# Patient Record
Sex: FEMALE | Race: WHITE
[De-identification: names, ages, dates, MRNs, and addresses within clinical notes are randomized per-mention and may not be internally consistent; named-entity substitution may affect disease eponyms.]

---

## 2018-12-11 ENCOUNTER — HOSPITAL ENCOUNTER (INPATIENT)
Dept: HOSPITAL 80 - BREH | Age: 83
LOS: 7 days | Discharge: HOME | DRG: 57 | End: 2018-12-18
Attending: INTERNAL MEDICINE | Admitting: INTERNAL MEDICINE
Payer: COMMERCIAL

## 2018-12-11 DIAGNOSIS — D44.6: ICD-10-CM

## 2018-12-11 DIAGNOSIS — T44.0X6A: ICD-10-CM

## 2018-12-11 DIAGNOSIS — R13.19: ICD-10-CM

## 2018-12-11 DIAGNOSIS — G70.01: Primary | ICD-10-CM

## 2018-12-11 DIAGNOSIS — E03.9: ICD-10-CM

## 2018-12-11 RX ADMIN — PYRIDOSTIGMINE BROMIDE SCH MG: 60 TABLET ORAL at 20:09

## 2018-12-11 NOTE — GHP
POST ADMISSION PHYSICIAN EVALUATION AND REHABILITATION TREATMENT PLAN



DATE OF ADMISSION:  12/11/2018



DATE OF EVALUATION:  12/11/2018.



TIME OF EVALUATION:  1730 hours.



REFERRING FACILITY:  [facility].



REFERRING PHYSICIAN:  Toño



IMPAIRMENT GROUP:  3.8.



DATE OF ONSET:  12/01/2018.



CONSULTING PHYSICIANS:  She was seen in consultation by neurologist, Dr. Givens.  
Critical care physician, Dr. Francisco. Nephrologist, Dr. Pan.



REHABILITATION DIAGNOSIS:  Debility, status post myasthenia gravis crisis.



ETIOLOGIC DIAGNOSIS:  Neuromuscular disorders.



HISTORY OF PRESENT ILLNESS:  This patient has a history of myasthenia gravis.  
She was treated with pyridostigmine, but she reports that she was not 
consistent with taking it.  She had approximately a 1-week history of waxing 
and waning symptoms with difficulty speaking and swallowing, difficulty 
clearing secretions, perioral/oral numbness, and coughing.  At the hospital, 
she was found to be in acute respiratory failure and unable to clear 
secretions.  She was intubated on 12/01/2018 and extubated on 12/03/2018.  She 
received plasma exchange and 5 days of IVIG.  She was treated with high-dose 
corticosteroids, as well as pyridostigmine.  She had an episode of paroxysmal 
atrial fibrillation while in the hospital.  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 
a right carotid paraganglioma.  It was evaluated for catecholamine secretion 
with a test for urinary metanephrines, which was negative.She had considerable 
improvement, and is otherwise medically stable and ready for inpatient 
rehabilitation.  



LABS AND STUDIES DURING HER STAY:  Basic metabolic profile was followed, and 
was normal and remained stable on tests to which I have access 12/08 through 12/
10/2018.  CBC showed an elevated white blood cell count consistent with 
corticosteroid treatment.  She also had a low platelet count.  Otherwise, CBC 
was normal.  Triglycerides were normal.  Brain imaging showed white matter 
changes consistent with ischemic small vessel disease.  She had mild-to-
moderate proximal left vertebral artery stenosis, and the paraganglioma was 
seen.  There was no evidence of acute infarction and no significant mass 
lesions seen.  Chest x-rays ruled out pneumonia and verified placement of 
endotracheal tube.  An abdominal x-ray verified placement of a small bowel 
feeding tube.



PRECAUTIONS:  She is a fall risk, and she has aspiration precautions.



ACTIVE COMORBIDITIES:  She has the tier 2 comorbidity of dysphagia.  She has no 
other active tier 1, tier 2, or tier 3 comorbidities.



PAST MEDICAL HISTORY:  

1.  Hypothyroidism.

2.  Myasthenia gravis.

3.  Kyphosis.



PAST SURGICAL HISTORY:  She has not had any surgeries.



PRE-HOSPITAL MEDICATIONS:  

1.  Levothyroxine 100 mcg p.o. daily.

2.  Pyridostigmine 60 mg p.o. three times daily.



ADMISSION MEDICATIONS:  

1.  Levothyroxine 100 mcg p.o. daily.

2.  Omeprazole 20 mg p.o. daily, to be taken as long as she is taking 
prednisone.

3.  Prednisone 40 mg p.o. daily, with taper to be determined on neurology 
followup.

4.  Pyridostigmine 60 mg p.o. three times daily.



ALLERGIES:  There is an allergy listed to penicillin.



PSYCHOSOCIAL HISTORY:  She lives alone.  She has a downstairs roommate, whom 
she was able to call for help, who took her to the hospital.  She is a 
nonsmoker and a nondrinker.  She worked at the [employer] as a 
.  She has a local son.



FAMILY HISTORY:  Noncontributory.



REVIEW OF SYSTEMS:  She is not in pain.  She reports she has had considerable 
improvement in terms of her strength, but she feels weak due to her multiple 
days flat on her back in the hospital.  She denies difficulty swallowing.  She 
does not notice fatigue of small muscles with repetitive use and is not aware 
of any dysarthria.  She has no vision changes.  She reports normal sensation.  
She denies cough or dyspnea.  She denies chest pain or palpitations.  She 
denies nausea, vomiting, constipation, or diarrhea, and she has a good 
appetite.  She denies dysuria or urinary frequency.  Otherwise, a 10-point 
review of systems is negative.



PHYSICAL EXAM:  VITAL SIGNS:  Blood pressure is 134/78, heart rate 75, 
respiratory rate 16, oxygen saturation 95% on room air, and temperature 36.8 
degrees centigrade.  Her weight is 67.1 kg for a body mass index of 23.9.  
GENERAL:  This is a well-nourished, well-developed elderly female, wearing 
hospital scrubs, sitting up in a chair, eating dinner, cooperative and in no 
acute distress.  HEENT:  Extraocular movements are intact.  Pupils are equal, 
round, and reactive to light.  Mucous membranes are moist.  Dentition is in 
good condition.  She has been uncrowded airway, Mallampati class 1.  NECK:  
Supple.  HEART:  There is a regular rate and rhythm with no murmurs, rubs, or 
gallops.  LUNGS:  Clear to auscultation bilaterally.  ABDOMEN:  Soft, nontender
, and nondistended with normoactive bowel sounds.  BACK AND EXTREMITIES:  She 
has notable kyphosis.  There is no cyanosis, clubbing, or edema.  Radial and 
dorsalis pedis pulses are 2+ bilaterally.  NEUROLOGIC:  She is alert and 
oriented x3.  Cranial nerves 2 through 12 are grossly intact.  There is no 
focal weakness.  Sensation is intact to light touch.  There is no dysarthria.  
She is able to arise from seated independently, and she can ambulate with a 
slightly wide base of support and short steps.



CURRENT LEVEL OF FUNCTION PER THE PREADMISSION SCREEN:  She was on a dysphagia 
1 diet with nectar-thick liquids; however, she has since been advanced to a 
regular textured diet and thin liquids.  Grooming required setup and 
supervision.  Dressing required maximal assist for lower body dressing, 
especially socks.  Toileting was done with supervision.  Bed mobility required 
moderate assist from supine to sit.  Transfers required supervision for sit to 
stand.  She used a front-wheeled walker.  She was able to stand as long as 10 
minutes with occasional rests.  She was able to ambulate with supervision and a 
front-wheeled walker for 150 feet.  Communication and cognition were considered 
to be intact. 



On today's exam, she appears to have developed more independence with mobility.
  Otherwise, there are no significant changes from the pre-admission screen.



IMPRESSION:  This is an 83-year-old woman with a history of myasthenia gravis, 
who was inconsistent in use of pyridostigmine and developed a myasthenia gravis 
crisis with inability to clear secretions, difficulty swallowing, and 
subsequent acute respiratory failure.  She was intubated.  She received 
treatment including plasmapheresis, IVIG, and high-dose steroids, and she has 
had recovery.  However, she remains in a debilitated state from her time in the 
hospital, including intubation and associated bedrest and inactivity.  During 
her hospitalization, she had an episode of paroxysmal atrial fibrillation which 
resolved.  She was discharged on no anticoagulation.  There was also an 
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 
ENT.  She otherwise was medically stable and is appropriate for inpatient 
rehabilitation. 



Her goal is to complete a rehabilitation stay and then return home with support 
from her roommate and her son.  She wishes to be independent for mobility and 
activities of daily.  For a safe discharge, it is anticipated that she will be 
able to advance to a modified independent level for all activities of daily 
living and mobility using the least restrictive device.  She may have support 
from her roommate and her son for household duties.  She will need to be on the 
least restrictive diet and to manage oral secretions.  She will need to have 
insight into her deficits and be able to use compensatory strategies 
independently. 



She will have therapy with Physical Therapy, Occupational Therapy, and Speech 
and Language Pathology for 60 minutes per day for each discipline on 5 to 7 
days of the week.  Her expected duration of stay is 5 to 7 days. 



It is anticipated that upon discharge, she may benefit from home health 
services including Nursing, Speech and Language Pathology, Occupational Therapy
, and Physical Therapy, as well as a myasthenia gravis support group.



PLAN:  

1.  Debility following hospitalization for myasthenia gravis with considerable 
recovery following treatment with plasmapheresis, IVIG, and high-dose 
corticosteroids.  PT and OT to optimize mobility and activities of daily living 
towards discharge home at the modified independent level.

2.  Dysphagia appears to have largely resolved.  She is discharged on thin 
liquids and a regular texture diet.  She will have screening per Speech and 
Language Pathology.

3.  Myasthenia gravis.  Continue prednisone at 40 mg daily and pyridostigmine 
at 60 mg three times daily.  She will follow up with Neurology after her 
discharge for a prednisone taper.

4.  Hypothyroidism.  Continue levothyroxine.

5.  Right carotid paraganglioma.  Plan from the hospital was to follow up with 
ENT, as well as Oncology at [facility].

6.  Prophylaxis.  The episode of atrial fibrillation was considered to be due 
to critical illness, and no recommendation was made regarding anticoagulation.  
She is on prednisone at a high dose, and so she has been cautiously prescribed 
omeprazole for GI protection.  She will continue likely pantoprazole per 
hospital formulary, likely throughout her rehabilitation stay.



FOLLOWUP:  She will follow up with primary care provider, Dr. Longo, at the 
[facility].  She will follow up with 
[facility] within 1 to 2 weeks.  She will follow up regarding her 
paraganglioma, with the plan being to be seen by a specialist at [facility].





Job #:  [id number]

MTDD

## 2018-12-12 RX ADMIN — PYRIDOSTIGMINE BROMIDE SCH MG: 60 TABLET ORAL at 08:37

## 2018-12-12 RX ADMIN — PYRIDOSTIGMINE BROMIDE SCH MG: 60 TABLET ORAL at 15:58

## 2018-12-12 RX ADMIN — PYRIDOSTIGMINE BROMIDE SCH MG: 60 TABLET ORAL at 20:21

## 2018-12-12 RX ADMIN — LEVOTHYROXINE SODIUM SCH MCG: 100 TABLET ORAL at 06:17

## 2018-12-12 RX ADMIN — PANTOPRAZOLE SODIUM SCH MG: 40 TABLET, DELAYED RELEASE ORAL at 06:17

## 2018-12-12 NOTE — SOAPPROG
SOAP Progress Note


Assessment/Plan: 


Assessment:





Debility-recent hospitalization from ice any a gravis with considerable 

recovery following treatment with plasmapheresis, IVIG, and high-dose 

corticosteroids.  PT and OT to optimize mobility and activities of daily living 

towards discharge home at the [facility]





Dysphagia-She was admitted to the rehab service on thin liquids and regular 

texture diet.  Speech and language pathology consulted and awaiting input





Myasthenis Gravis.  Continue prednisone 40 mg daily and pyridostigmine 60 mg 

three times daily.  Follow-up with Neurology after her discharge for prednisone 

taper.








Hypothyroidism.  Continue levothyroxine.





Right carotid paraganglioma.  Follow-up with ENT as well as Oncology at 

[facility].





Prophylaxis.  The episode of atrial fibrillation was considered to be due to 

critical illness and no recommendation was made regarding anticoagulation.  She 

is on prednisone at high dose and so she has been cautiously prescribed 

omeprazole for GI prophylaxis.





Dorsal kyphosis-physical therapy to instruct on exercises to stretch the 

pectorals and strengthen the shoulder elevators and retractors to improve 

posture and pulmonary function.





Follow-up:  She will follow-up with primary care provider, Dr. Longo at the 

[facility] clinic.  She will have follow-up with [facility] within 1

-2 weeks.  She will have follow-up regarding her para ganglioma at [facility]























Plan:





12/12/18 12:36





Subjective: 





She reports she feels a little bit fatigued after multiple therapy sessions 

this morning.  She does report that she is feeling stronger.  She denies 

shortness of breath or dyspnea on exertion.  No reports of anginal like 

symptoms during therapy.


Objective: 





 Vital Signs











Temp Pulse Resp BP Pulse Ox


 


 36.6 C   66   16   138/76 H  96 


 


 12/12/18 08:00  12/12/18 08:00  12/12/18 08:00  12/12/18 08:00  12/12/18 08:00








 Laboratory Results





 12/12/18 06:50 





 











 12/11/18 12/12/18 12/13/18





 05:59 05:59 05:59


 


Intake Total  375 


 


Output Total  450 


 


Balance  -75 














Physical Exam





- Physical Exam


General Appearance: WD/WN, alert, no apparent distress


Respiratory: lungs clear, normal breath sounds, other (Dorsal kyphosis noted)


Abdomen: normal bowel sounds, non-tender, soft


Skin: warm/dry


Extremities: No swelling, No Elvis's sign


Neuro/Psych: alert, normal mood/affect, oriented x 3 (Subjectively reports 

weakness in both upper and lower extremities ), No speech abnormalities





ICD10 Worksheet


Patient Problems: 


 Problems











Problem Status Onset


 


Myasthenia gravis Acute

## 2018-12-13 RX ADMIN — PYRIDOSTIGMINE BROMIDE SCH MG: 60 TABLET ORAL at 20:01

## 2018-12-13 RX ADMIN — PANTOPRAZOLE SODIUM SCH MG: 40 TABLET, DELAYED RELEASE ORAL at 06:53

## 2018-12-13 RX ADMIN — PYRIDOSTIGMINE BROMIDE SCH MG: 60 TABLET ORAL at 09:14

## 2018-12-13 RX ADMIN — PYRIDOSTIGMINE BROMIDE SCH MG: 60 TABLET ORAL at 16:19

## 2018-12-13 RX ADMIN — LEVOTHYROXINE SODIUM SCH MCG: 100 TABLET ORAL at 06:53

## 2018-12-13 NOTE — SOAPPROG
SOAP Progress Note


Assessment/Plan: 


Assessment:





Debility-recent hospitalization from myasthenia gravis with considerable 

recovery following treatment with plasmapheresis, IVIG, and high-dose 

corticosteroids.  PT and OT to optimize mobility and activities of daily living 

towards discharge home at the [facility].  SHE REPORTS HER STRENGTH IS IMPROVING.

  SHE IS AMBULATING 150 FT WITH THE [facility] STANDBY ASSIST.





Dysphagia-She was admitted to the rehab service on thin liquids and regular 

texture diet.  Speech and language pathology consulted and awaiting input.  

DENIES DYSPHAGIA.  SHE IS TOLERATING REGULAR DIET AND THIN LIQUIDS.





Myasthenis Gravis.  Continue prednisone 40 mg daily and pyridostigmine 60 mg 

three times daily.  Follow-up with Neurology after her discharge for prednisone 

taper.  SHE IS FOLLOWED BY [facility].





Hypothyroidism.  Continue levothyroxine.





Right carotid paraganglioma.  Follow-up with ENT as well as Oncology at 

[facility].





Prophylaxis.  The episode of atrial fibrillation was considered to be due to 

critical illness and no recommendation was made regarding anticoagulation.  She 

is on prednisone at high dose and so she has been cautiously prescribed 

omeprazole for GI prophylaxis.





Dorsal kyphosis-Physical therapy to instruct on exercises to stretch the 

pectorals and strengthen the shoulder elevators and retractors to improve 

posture and pulmonary function.





Follow-up:  She will follow-up with primary care provider, Dr. Longo at the 

[facility].  She will have follow-up with [facility] within 1-2 weeks.

  She will have follow-up regarding her para ganglioma at [facility]

















12/13/18 09:19





Subjective: 





SHE REPORTS THAT A SHE IS PLEASED WITH PHYSICAL THERAPY AND THEY ARE TRYING TO 

ESTABLISH HER BASELINE UPPER AND LOWER EXTREMITY STRENGTH.  SHE DOES REPORT 

THAT SHE HAS AT LEAST 2 FLARE UPS OF MYASTHENIA GRAVIS PER YEAR.  SHE REPORTS 

THAT THEY HAVE BEEN HARD TO CONTROL WITH MEDICATIONS.  SHE IS FOLLOWED BY [facility].  SHE STATES THIS MOST RECENT EPISODE INCLUDED DIFFICULTY 

SWALLOWING AND NECK MUSCLE WEAKNESS.  SHE CURRENTLY DENIES DYSPHAGIA OR 

SHORTNESS OF BREATH.  SHE DOES REPORT THAT HER UPPER LOWER EXTREMITY STRENGTH 

IS IN IMPROVING.


Objective: 





 Vital Signs











Temp Pulse Resp BP Pulse Ox


 


 36.7 C   71   16   137/82 H  96 


 


 12/13/18 07:16  12/12/18 20:00  12/13/18 07:16  12/13/18 07:16  12/13/18 07:16








 Laboratory Results





 12/12/18 06:50 





 











 12/12/18 12/13/18 12/14/18





 05:59 05:59 05:59


 


Intake Total 375 1240 


 


Output Total 450  


 


Balance -75 1240 














Physical Exam





- Physical Exam


General Appearance: WD/WN, alert, no apparent distress


Neck: other (HEAD HELD IN NEUTRAL POSITION.  EXAGGERATED DORSAL KYPHOSIS NOTED)


Respiratory: chest non-tender, lungs clear, normal breath sounds


Cardiac/Chest: No edema


Abdomen: normal bowel sounds, non-tender


Skin: normal color, warm/dry


Neuro/Psych: alert, normal mood/affect, oriented x 3, No speech abnormalities





ICD10 Worksheet


Patient Problems: 


 Problems











Problem Status Onset


 


Myasthenia gravis Acute

## 2018-12-14 RX ADMIN — LEVOTHYROXINE SODIUM SCH MCG: 100 TABLET ORAL at 06:08

## 2018-12-14 RX ADMIN — PANTOPRAZOLE SODIUM SCH MG: 40 TABLET, DELAYED RELEASE ORAL at 06:08

## 2018-12-14 RX ADMIN — PYRIDOSTIGMINE BROMIDE SCH MG: 60 TABLET ORAL at 08:36

## 2018-12-14 RX ADMIN — PYRIDOSTIGMINE BROMIDE SCH MG: 60 TABLET ORAL at 20:46

## 2018-12-14 RX ADMIN — PYRIDOSTIGMINE BROMIDE SCH MG: 60 TABLET ORAL at 16:22

## 2018-12-14 NOTE — SOAPPROG
SOAP Progress Note


Assessment/Plan: 


Assessment:





Debility-recent hospitalization from myasthenia gravis with considerable 

recovery following treatment with plasmapheresis, IVIG, and high-dose 

corticosteroids.  PT and OT to optimize mobility and activities of daily living 

towards discharge home at the [facility].  SHE REPORTS HER STRENGTH IS IMPROVING.

  SHE IS AMBULATING 150 FT WITH THE [facility] STANDBY ASSIST.





Dysphagia-She was admitted to the rehab service on thin liquids and regular 

texture diet.  Speech and language pathology consulted and awaiting input.  

DENIES DYSPHAGIA.  SHE IS TOLERATING REGULAR DIET AND THIN LIQUIDS.





Myasthenis Gravis.  Continue prednisone 40 mg daily and pyridostigmine 60 mg 

three times daily.  Follow-up with Neurology after her discharge for prednisone 

taper.  SHE IS FOLLOWED BY [facility]UR[facility].





Hypothyroidism.  Continue levothyroxine.





Right carotid paraganglioma.  Follow-up with ENT as well as Oncology at 

[facility].





Prophylaxis.  The episode of atrial fibrillation was considered to be due to 

critical illness and no recommendation was made regarding anticoagulation.  She 

is on prednisone at high dose and so she has been cautiously prescribed 

omeprazole for GI prophylaxis.





Dorsal kyphosis-Physical therapy to instruct on exercises to stretch the 

pectorals and strengthen the shoulder elevators and retractors to improve 

posture and pulmonary function.





Follow-up:  She will follow-up with primary care provider, Dr. Longo at the 

[facility].  She will have follow-up with [facility] within 1-2 weeks.

  She will have follow-up regarding her para ganglioma at [facility]

















12/13/18 09:19





Subjective: 





No new complaints.  She reports that she is very pleased with her progress in 

her therapy sessions.


Objective: 





 Vital Signs











Temp Pulse Resp BP Pulse Ox


 


 36.7 C   64   16   151/86 H  96 


 


 12/14/18 06:16  12/14/18 06:16  12/14/18 06:16  12/14/18 06:16  12/14/18 06:16








 Laboratory Results





 12/12/18 06:50 





 











 12/13/18 12/14/18 12/15/18





 05:59 05:59 05:59


 


Intake Total 1240 240 600


 


Balance 1240 240 600














Physical Exam





- Physical Exam


General Appearance: WD/WN, alert, no apparent distress


Respiratory: lungs clear, normal breath sounds


Abdomen: non-tender, soft, other (No suprapubic tenderness)


Skin: normal color, warm/dry


Extremities: No swelling, No Elvis's sign


Neuro/Psych: motor weakness (Has some weakness of the right and left hip 

flexors.  4+/5 right and left hip abductors, abductors, quadriceps, hamstrings 

and ankle dorsiflexors.), other (Steppage gait with plantigrade foot strike.)





ICD10 Worksheet


Patient Problems: 


 Problems











Problem Status Onset


 


Myasthenia gravis Acute

## 2018-12-15 RX ADMIN — PYRIDOSTIGMINE BROMIDE SCH MG: 60 TABLET ORAL at 21:01

## 2018-12-15 RX ADMIN — PYRIDOSTIGMINE BROMIDE SCH MG: 60 TABLET ORAL at 16:12

## 2018-12-15 RX ADMIN — PYRIDOSTIGMINE BROMIDE SCH MG: 60 TABLET ORAL at 08:53

## 2018-12-15 RX ADMIN — PANTOPRAZOLE SODIUM SCH MG: 40 TABLET, DELAYED RELEASE ORAL at 05:35

## 2018-12-15 RX ADMIN — LEVOTHYROXINE SODIUM SCH MCG: 100 TABLET ORAL at 05:35

## 2018-12-15 NOTE — HOSPPROG
Hospitalist Progress Note


Assessment/Plan: 





Debility-recent hospitalization from myasthenia gravis with considerable 

recovery following treatment with plasmapheresis, IVIG, and high-dose 

corticosteroids.  PT and OT to optimize mobility and activities of daily living 

towards discharge home at the [facility].  SHE REPORTS HER STRENGTH IS IMPROVING.

  SHE IS AMBULATING 150 FT WITH THE [facility] STANDBY ASSIST.





Dysphagia-She was admitted to the rehab service on thin liquids and regular 

texture diet.  Speech and language pathology consulted and awaiting input.  

DENIES DYSPHAGIA.  SHE IS TOLERATING REGULAR DIET AND THIN LIQUIDS.





Myasthenis Gravis.  Continue prednisone 40 mg daily and pyridostigmine 60 mg 

three times daily.  Follow-up with Neurology after her discharge for prednisone 

taper.  SHE IS FOLLOWED BY [facility].





Hypothyroidism.  Continue levothyroxine.





Right carotid paraganglioma.  Follow-up with ENT as well as Oncology at 

[facility].





Prophylaxis.  The episode of atrial fibrillation was considered to be due to 

critical illness and no recommendation was made regarding anticoagulation.  She 

is on prednisone at high dose and so she has been cautiously prescribed 

omeprazole for GI prophylaxis.





Dorsal kyphosis-Physical therapy to instruct on exercises to stretch the 

pectorals and strengthen the shoulder elevators and retractors to improve 

posture and pulmonary function.





Follow-up:  She will follow-up with primary care provider, Dr. Longo at the 

[facility].  She will have follow-up with [facility] within 1-2 weeks.

  She will have follow-up regarding her para ganglioma at [facility]











Subjective: no new complaints. doing well with therapy


Objective: 


 Vital Signs











Temp Pulse Resp BP Pulse Ox


 


 36.8 C   73   15   154/92 H  94 


 


 12/15/18 07:01  12/15/18 07:01  12/15/18 07:01  12/15/18 07:01  12/15/18 07:01








 Laboratory Results





 12/12/18 06:50 





 











 12/14/18 12/15/18 12/16/18





 05:59 05:59 05:59


 


Intake Total 240 1430 


 


Output Total  1 


 


Balance 240 1429 














- Physical Exam


Constitutional: no apparent distress, appears nourished, not in pain


Eyes: anicteric sclera, EOMI


Ears, Nose, Mouth, Throat: moist mucous membranes


Cardiovascular: regular rate and rhythym


Respiratory: no respiratory distress


Neurologic: AAOx3


Psychiatric: interacting appropriately, not anxious, not encephalopathic, 

thought process linear





ICD10 Worksheet


Patient Problems: 


 Problems











Problem Status Onset


 


Myasthenia gravis Acute

## 2018-12-16 RX ADMIN — PANTOPRAZOLE SODIUM SCH MG: 40 TABLET, DELAYED RELEASE ORAL at 06:21

## 2018-12-16 RX ADMIN — PYRIDOSTIGMINE BROMIDE SCH MG: 60 TABLET ORAL at 08:31

## 2018-12-16 RX ADMIN — PYRIDOSTIGMINE BROMIDE SCH MG: 60 TABLET ORAL at 15:58

## 2018-12-16 RX ADMIN — LEVOTHYROXINE SODIUM SCH MCG: 100 TABLET ORAL at 06:21

## 2018-12-16 RX ADMIN — PYRIDOSTIGMINE BROMIDE SCH MG: 60 TABLET ORAL at 20:55

## 2018-12-16 NOTE — HOSPPROG
Hospitalist Progress Note


Assessment/Plan: 





Debility-recent hospitalization from myasthenia gravis with considerable 

recovery following treatment with plasmapheresis, IVIG, and high-dose 

corticosteroids.  PT and OT to optimize mobility and activities of daily living 

towards discharge home at the [facility].  SHE REPORTS HER STRENGTH IS IMPROVING.

  SHE IS AMBULATING 150 FT WITH THE [facility] STANDBY ASSIST.





Dysphagia-She was admitted to the rehab service on thin liquids and regular 

texture diet.  Speech and language pathology consulted and awaiting input.  

DENIES DYSPHAGIA.  SHE IS TOLERATING REGULAR DIET AND THIN LIQUIDS.





Myasthenis Gravis.  Continue prednisone 40 mg daily and pyridostigmine 60 mg 

three times daily.  Follow-up with Neurology after her discharge for prednisone 

taper.  SHE IS FOLLOWED BY [facility] NEUROLOGY.





Hypothyroidism.  Continue levothyroxine.





Right carotid paraganglioma.  Follow-up with ENT as well as Oncology at 

[facility].





Prophylaxis.  The episode of atrial fibrillation was considered to be due to 

critical illness and no recommendation was made regarding anticoagulation.  She 

is on prednisone at high dose and so she has been cautiously prescribed 

omeprazole for GI prophylaxis.





Dorsal kyphosis-Physical therapy to instruct on exercises to stretch the 

pectorals and strengthen the shoulder elevators and retractors to improve 

posture and pulmonary function.





Follow-up:  She will follow-up with primary care provider, Dr. Longo at the 

[facility].  She will have follow-up with [facility] within 1-2 weeks.

  She will have follow-up regarding her para ganglioma at [facility]











Subjective: doing great. no new complaints


Objective: 


 Vital Signs











Temp Pulse Resp BP Pulse Ox


 


 36.7 C   65   15   162/90 H  96 


 


 12/16/18 06:52  12/16/18 06:52  12/16/18 06:52  12/16/18 06:52  12/16/18 06:52








 Laboratory Results





 12/12/18 06:50 





 











 12/15/18 12/16/18 12/17/18





 05:59 05:59 05:59


 


Intake Total 1430 1190 250


 


Output Total 1  


 


Balance 1429 1190 250














- Physical Exam


Constitutional: no apparent distress, appears nourished, not in pain


Eyes: anicteric sclera, EOMI


Ears, Nose, Mouth, Throat: moist mucous membranes


Cardiovascular: regular rate and rhythym


Respiratory: no respiratory distress, no rales or rhonchi, clear to auscultation


Gastrointestinal: normoactive bowel sounds, soft, non-tender abdomen, no 

palpable masses


Skin: warm


Neurologic: AAOx3


Psychiatric: interacting appropriately, not anxious, not encephalopathic, 

thought process linear





ICD10 Worksheet


Patient Problems: 


 Problems











Problem Status Onset


 


Myasthenia gravis Acute

## 2018-12-17 RX ADMIN — LEVOTHYROXINE SODIUM SCH MCG: 100 TABLET ORAL at 06:00

## 2018-12-17 RX ADMIN — PYRIDOSTIGMINE BROMIDE SCH MG: 60 TABLET ORAL at 15:38

## 2018-12-17 RX ADMIN — PYRIDOSTIGMINE BROMIDE SCH MG: 60 TABLET ORAL at 20:56

## 2018-12-17 RX ADMIN — PANTOPRAZOLE SODIUM SCH MG: 40 TABLET, DELAYED RELEASE ORAL at 06:00

## 2018-12-17 RX ADMIN — PYRIDOSTIGMINE BROMIDE SCH MG: 60 TABLET ORAL at 08:33

## 2018-12-17 NOTE — SOAPPROG
SOAP Progress Note


Assessment/Plan: 


Assessment:





Debility following hospitalization for myasthenia gravis with considerable 

recovery following treatment with plasmapheresis, IVIG, and high-dose 

corticosteroids.  


* Much improved.  Has been independent in her room and on the unit since 12/15/

2018.  


* Continue PT and OT to optimize mobility and activities of daily living 

towards discharge home at the modified independent level.





Dysphagia, resolved





Myasthenia gravis.  Continue prednisone at 40 mg daily and pyridostigmine at 60 

mg three times daily.  She will follow up with Neurology after her discharge 

for a prednisone taper.





Hypothyroidism.  Continue levothyroxine.





Right carotid paraganglioma.  Plan from the hospital was to follow up with ENT, 

as well as Oncology at [facility].





Prophylaxis.  The episode of atrial fibrillation was considered to be due to 

critical illness, and no recommendation was made regarding anticoagulation.  

Will not continue proton pump inhibitor upon discharge as absolute risk of 

peptic ulcer on prednisone it is very low.





FOLLOWUP:  She will follow up with primary care provider, Dr. Corbin.  She will 

follow up with neurologist  1 to 2 weeks.  She will follow up 

regarding her paraganglioma, with the plan being to be seen by a specialist at 

[facility].





12/17/18 13:39





Subjective: 





No complaints.  Feels ready to go home tomorrow.


Objective: 





 Vital Signs











Temp Pulse Resp BP Pulse Ox


 


 36.7 C   69   16   160/88 H  95 


 


 12/17/18 06:32  12/17/18 06:32  12/17/18 06:32  12/17/18 06:32  12/17/18 06:32








 Laboratory Results





 12/12/18 06:50 





 











 12/16/18 12/17/18 12/18/18





 05:59 05:59 05:59


 


Intake Total 1190 700 300


 


Balance 1190 700 300














Physical Exam





- Physical Exam


General Appearance: WD/WN, alert, no apparent distress


Respiratory: No respiratory distress, No accessory muscle use


Skin: normal color, warm/dry


Neuro/Psych: alert, normal mood/affect, oriented x 3, sensory deficit (Hard of 

hearing)





ICD10 Worksheet


Patient Problems: 


 Problems











Problem Status Onset


 


Myasthenia gravis Acute

## 2018-12-17 NOTE — PDOREHIP
Admission [facility]





- Admission - 3 Day Assessment Period


Admission Date/Day 1: 12/11/18


Day 2: 12/12/18


Day 3: 12/13/18





- Active Diagnoses


Comorbidities and Co-existing Conditions at Admission: 80137. None of the Above





- Skin Conditions


Unhealed Pressure Ulcer (1 or more/Stage 1 or >)-Admission: 0. No


# Stage 1 Pressure Ulcers-Admission: 0


# Stage 2 Pressure Ulcers-Admission: 0


# Stage 3 Pressure Ulcers-Admission: 0


# Stage 4 Pressure Ulcers-Admission: 0


# Unstageable Pressure Ulcers (Non-remove Dress)-Admission: 0


# Unstageable Pressure Ulcers (Slough/Eschar)-Admission: 0


# Unstageable Pressure Ulcers (Deep Tissue Injury)-Admission: 0





Discharge [facility]





- Discharge  - 3 Day Assessment Period


2 Days Prior to Anticipated Discharge Date: 12/16/18


1 Day Prior to Anticipated Discharge Date: 12/17/18


Anticipated Discharge Date: 12/18/18

## 2018-12-18 VITALS — DIASTOLIC BLOOD PRESSURE: 96 MMHG | SYSTOLIC BLOOD PRESSURE: 150 MMHG

## 2018-12-18 RX ADMIN — LEVOTHYROXINE SODIUM SCH MCG: 100 TABLET ORAL at 05:53

## 2018-12-18 RX ADMIN — PANTOPRAZOLE SODIUM SCH MG: 40 TABLET, DELAYED RELEASE ORAL at 05:53

## 2018-12-18 RX ADMIN — PYRIDOSTIGMINE BROMIDE SCH MG: 60 TABLET ORAL at 08:37

## 2018-12-19 NOTE — GDS
ADMISSION DIAGNOSIS:  Debility, status post myasthenia gravis crisis.



DISCHARGE DIAGNOSIS:  Debility, status post myasthenia gravis crisis.



OTHER DISCHARGE DIAGNOSES:  

1.  Hypothyroidism.

2.  Right carotid paraganglioma.



COMPLICATIONS:  There were none.



CONSULTATIONS:  There were none.



PROCEDURES:  There were none.



HISTORY AND HOSPITAL COURSE:  This patient was admitted from [facility] in [facility].  She had presented there on 12/11/2018, with a 1-week history of waxing and waning symptoms 
of difficulty speaking and swallowing, difficulty clearing secretions, perioral numbness and coughing
.  She was found to be in acute respiratory failure, unable to clear secretions in the hospital and s
he was intubated from 12/01/2018 to 12/03/2018.  She was diagnosed with a myasthenia gravis crisis, p
ossibly due to noncompliance with pyridostigmine. She was treated with plasma exchange and 5 days of 
IVIG as well as high-dose corticosteroids.  Pyridostigmine was restarted.  There was an episode of pa
roxysmal atrial fibrillation in the hospital.  There was an incidental finding of a right carotid par
aganglioma.  There was no catecholamine secretion found in a test for urinary metanephrines. She had 
improvement in her condition and was appropriate for inpatient rehabilitation. 



She did well in rehabilitation. On the day of discharge she was able to climb and descend 18 steps us
ing a rail with modified independence.  She could go up and down an 8 inch curb step with a trekking 
pole and modified independence.  She ambulated 400 feet with a trekking pole and modified independenc
e.  She was independent to modified independent for activities of daily living using assistive device
s for bathing, bath transfers, and toileting. 



Regarding hypothyroidism, she was continued on levothyroxine and showed no signs or symptoms of hyper
 or hypothyroidism.



DISCHARGE PLAN:  Discharge disposition is home.  Condition is good.  There are no new allergies other
 than her prior allergy to penicillin.



ACTIVITY:  Ad mike.



MEDICATIONS UPON DISCHARGE:  

1.  Levothyroxine 100 mcg p.o. daily.

2.  Prednisone 40 mg p.o. daily.

3.  Pyridostigmine 60 mg p.o. three times daily.



ISSUES TO BE ADDRESSED AT FOLLOW UP:  

1.  Mobility and activities of daily living.  She will continue home physical therapy.

2.  Myasthenia gravis.  She will be seen by neurologist, Dr. Tuttle. 

3.  Paraganglioma of the right carotid.  During her acute hospitalization, followup was planned with 
ENT and Oncology at the [facility] in [place], [place].



Copy requested to:

Dr. Shaquille Tuttle



Job #:  [id number]

## 2018-12-19 NOTE — PDOREHIP
Admission [facility]





- Admission - 3 Day Assessment Period


Admission Date/Day 1: 12/11/18


Day 2: 12/12/18


Day 3: 12/13/18





- Active Diagnoses


Comorbidities and Co-existing Conditions at Admission: 36773. None of the Above





Discharge [facility]





- Discharge  - 3 Day Assessment Period


2 Days Prior to Anticipated Discharge Date: 12/16/18


1 Day Prior to Anticipated Discharge Date: 12/17/18


Anticipated Discharge Date: 12/18/18





- Discharge Skin Conditions


Unhealed Pressure Ulcer (1 or more/Stage 1 or >)-Discharge: 0. No


# Stage 1 Pressure Ulcers-Discharge: 0


# Stage 2 Pressure Ulcers-Discharge: 0


# of These Stage 2 Pressure Ulcers Present on Admission: 0


# Stage 3 Pressure Ulcers-Discharge: 0


# of These Stage 3 Pressure Ulcers Present on Admission: 0


# Stage 4 Pressure Ulcers-Discharge: 0


# of These Stage 4 Pressure Ulcers Present on Admission: 0


# Unstageable Pressure Ulcers (Non-remove Dress)-Discharge: 0


# These Unstageable Pressure Ulcers (NRD)-Present on Admit: 0


# Unstageable Pressure Ulcers (Slough/Eschar)-Discharge: 0


# These Unstageable Pressure Ulcers(Slough) Present on Admit: 0


# Unstageable Pressure Ulcers (Deep Tissue Injury)-Discharge: 0


# These Unstageable Pressure Ulcers (DTI) Present on Admit: 0